# Patient Record
(demographics unavailable — no encounter records)

---

## 2025-06-20 NOTE — END OF VISIT
[FreeTextEntry3] : This note was written by Isael Mcpherson on 06/18/2025 acting solely as a scribe for Dr. Jesus Blanca.   All medical record entries made by the Scribe were at my, Dr. Jesus Blanca, direction and personally dictated by me on 06/18/2025. I have personally reviewed the chart and agree that the record accurately reflects my personal performance of the history, physical exam, assessment and plan.

## 2025-06-20 NOTE — HISTORY OF PRESENT ILLNESS
[Right] : right hand dominant [FreeTextEntry1] : She comes in today for evaluation of bilateral hand pain after a fall during the winter. She states that her right hand is worse compared to her left. She takes Advil for her pain. She states that she has tried acupuncture on Monday, which did not provide her symptoms wit much relief. She reports bilateral tingling.   Occupation: She works as a hairdresser.

## 2025-06-20 NOTE — DISCUSSION/SUMMARY
[FreeTextEntry1] : She has findings consistent with right wrist pain secondary to de Quervain's tendinitis.  She has mild symptoms on the left side.  She also has complaints consistent with mild bilateral chronic carpal tunnel syndrome.  I had a discussion with the patient regarding today's visit, the prognosis of this diagnosis, and treatment recommendations and options. At this time, I recommended a cortisone injection, which she agreed to proceed with at the right first dorsal compartment.  At her request, she was also provided with the appropriate referral to occupational therapy.    The patient has agreed to the above plan of management and has expressed full understanding. All questions were fully answered to the patient's satisfaction.   My cumulative time spent on this visit included: Preparation for the visit, review of the medical records, review of pertinent diagnostic studies, examination and counseling of the patient on the above diagnosis, treatment plan and prognosis, orders of diagnostic tests, medication and/or appropriate procedures and documentation in the medical records of today's visit.

## 2025-06-20 NOTE — PROCEDURE
[FreeTextEntry1] : -  After a discussion of risks and benefits, the patient agreed to proceed with a cortisone injection.  -  Side: Right first dorsal compartment. -  Medications injected: 1 cc of 1% Lidocaine and 1 cc of Celestone Soluspan, 6mg/cc, using sterile technique. -  Ultrasound guidance: Ultrasound was used for diagnostic and therapeutic purposes. Ultrasound confirmed correct needle localization within the first dorsal compartment, prior to the ultrasound. -  Patient tolerated the procedure well, without complications. -  Immediate improvement of the symptoms, secondary to the anesthetic effects of the injection, was noted. -  Instructions:  The patient was told that the symptoms should hopefully begin to improve within the next several days. The use of ice, anti-inflammatory agents or Tylenol, and  activity modification was discussed. -  Follow-up: Within 4 weeks to assess response to the injection.

## 2025-06-20 NOTE — PHYSICAL EXAM
[de-identified] : - Constitutional: This is a female in no obvious distress.  - Psych: Patient is alert and oriented to person, place and time.  Patient has a normal mood and affect. - Cardiovascular: Normal pulses throughout the upper extremities.  No significant varicosities are noted in the upper extremities.  ---  Examination of her right wrist and hand demonstrates swelling and tenderness along the first dorsal compartment.  She has a positive Finkelstein sign.  There is no swelling or tenderness along the CMC joint of the thumb.  Provocative signs for carpal tunnel syndrome are negative.  She is neurovascularly intact distally.  Examination of her left wrist and hand demonstrates no obvious swelling.  There is mild tenderness along the first dorsal compartment.  There is a negative Finkelstein sign.  She is neurovascularly intact distally.  [de-identified] : PA, lateral, and oblique radiographs of the bilateral wrist and hands demonstrate mild narrowing of the CMC joints of the thumb. No evidence of wrists arthritis or bone spurs.

## 2025-06-20 NOTE — ADDENDUM
[FreeTextEntry1] :  I, Isael Mcpherson, acted solely as a scribe for Dr. Blanca on this date on 06/18/2025.

## 2025-06-20 NOTE — PHYSICAL EXAM
[de-identified] : - Constitutional: This is a female in no obvious distress.  - Psych: Patient is alert and oriented to person, place and time.  Patient has a normal mood and affect. - Cardiovascular: Normal pulses throughout the upper extremities.  No significant varicosities are noted in the upper extremities.  ---  Examination of her right wrist and hand demonstrates swelling and tenderness along the first dorsal compartment.  She has a positive Finkelstein sign.  There is no swelling or tenderness along the CMC joint of the thumb.  Provocative signs for carpal tunnel syndrome are negative.  She is neurovascularly intact distally.  Examination of her left wrist and hand demonstrates no obvious swelling.  There is mild tenderness along the first dorsal compartment.  There is a negative Finkelstein sign.  She is neurovascularly intact distally.  [de-identified] : PA, lateral, and oblique radiographs of the bilateral wrist and hands demonstrate mild narrowing of the CMC joints of the thumb. No evidence of wrists arthritis or bone spurs.

## 2025-07-08 NOTE — PHYSICAL EXAM
[de-identified] : - Constitutional: This is a female in no obvious distress.  - Psych: Patient is alert and oriented to person, place and time.  Patient has a normal mood and affect. - Cardiovascular: Normal pulses throughout the upper extremities.  No significant varicosities are noted in the upper extremities.  ---  Examination of her right wrist and hand demonstrates swelling and tenderness along the first dorsal compartment.  She has a positive Finkelstein sign.  There is no swelling or tenderness along the CMC joint of the thumb.  Provocative signs for carpal tunnel syndrome are negative.  She is neurovascularly intact distally.  Examination of her left wrist and hand demonstrates no obvious swelling.  There is mild tenderness along the first dorsal compartment.  There is a negative Finkelstein sign.  She is neurovascularly intact distally.  [de-identified] : PA, lateral, and oblique radiographs of the bilateral wrist and hands dated 6/18/2025 demonstrated mild narrowing of the CMC joints of the thumb. No evidence of wrists arthritis or bone spurs.

## 2025-07-08 NOTE — HISTORY OF PRESENT ILLNESS
[Right] : right hand dominant [FreeTextEntry1] : Follow-up regarding right greater than left de Quervain's tendinitis.  She also has chronic bilateral carpal tunnel syndrome.  She was seen in the office 4 weeks ago and she was given a cortisone injection at the right wrist first dorsal compartment.  At her request, she was also referred to occupational therapy.  She is  Occupation: She works as a hairdresser.

## 2025-07-09 NOTE — HISTORY OF PRESENT ILLNESS
[FreeTextEntry1] : Follow-up regarding right greater than left de Quervain's tendinitis.  She also has chronic bilateral carpal tunnel syndrome.  She was seen in the office 4 weeks ago and she was given a cortisone injection at the right wrist first dorsal compartment.  At her request, she was also referred to occupational therapy.  She is  Occupation: She works as a hairdresser.

## 2025-07-09 NOTE — PHYSICAL EXAM
[de-identified] : - Constitutional: This is a female in no obvious distress.  - Psych: Patient is alert and oriented to person, place and time.  Patient has a normal mood and affect. - Cardiovascular: Normal pulses throughout the upper extremities.  No significant varicosities are noted in the upper extremities.  ---  Examination of her right wrist and hand demonstrates swelling and tenderness along the first dorsal compartment.  She has a positive Finkelstein sign.  There is no swelling or tenderness along the CMC joint of the thumb.  Provocative signs for carpal tunnel syndrome are negative.  She is neurovascularly intact distally.  Examination of her left wrist and hand demonstrates no obvious swelling.  There is mild tenderness along the first dorsal compartment.  There is a negative Finkelstein sign.  She is neurovascularly intact distally.  [de-identified] : PA, lateral, and oblique radiographs of the bilateral wrist and hands dated 6/18/2025 demonstrated mild narrowing of the CMC joints of the thumb. No evidence of wrists arthritis or bone spurs.